# Patient Record
Sex: OTHER/UNKNOWN | URBAN - METROPOLITAN AREA
[De-identification: names, ages, dates, MRNs, and addresses within clinical notes are randomized per-mention and may not be internally consistent; named-entity substitution may affect disease eponyms.]

---

## 2024-08-05 ENCOUNTER — APPOINTMENT (OUTPATIENT)
Dept: URBAN - METROPOLITAN AREA CLINIC 298 | Age: 29
Setting detail: DERMATOLOGY
End: 2024-08-05

## 2024-08-05 DIAGNOSIS — L90.8 OTHER ATROPHIC DISORDERS OF SKIN: ICD-10-CM

## 2024-08-05 DIAGNOSIS — L98.8 OTHER SPECIFIED DISORDERS OF THE SKIN AND SUBCUTANEOUS TISSUE: ICD-10-CM

## 2024-08-05 PROCEDURE — OTHER DYSPORT: OTHER

## 2024-08-05 PROCEDURE — OTHER FILLERS: OTHER

## 2024-08-05 NOTE — PROCEDURE: DYSPORT
Anterior Platysmal Bands Units: 0
Show Additional Area 2: Yes
Additional Area 1 Location: nose
Show Right And Left Brow Units: No
Consent: Written consent obtained. Risks include but not limited to lid/brow ptosis, bruising, swelling, diplopia, temporary effect, incomplete chemical denervation.
Dilution (U/0.1 Cc): 10
Glabellar Complex Units: 50
Post-Care Instructions: Patient instructed to not lie down for 4 hours and limit physical activity for 24 hours.
Show Inventory Tab: Show
Additional Area 2 Location: chin
Detail Level: Detailed

## 2024-08-05 NOTE — PROCEDURE: FILLERS
Additional Area 1 Volume In Cc: 0
Include Cannula Information In Note?: No
Filler: Versa+
Detail Level: Detailed
Expiration Date (Month Year): 04/08/2025
Post-Care Instructions: After the procedure, patient instructed to apply ice to reduce swelling.
Additional Area 1 Location: chin
Consent: Written consent obtained. Risks include but not limited to bruising, beading, irregular texture, ulceration, infection, allergic reaction, scar formation, incomplete augmentation, temporary nature, and procedural pain.
Lot #: [id number]
Topical Anesthesia?: 15% lidocaine, 5% prilocaine, 0.25% phenenylephrine
Additional Anesthesia Volume In Cc: 6
Aspiration Statement: Aspiration was performed prior to injecting site with filler.
Expiration Date (Month Year): 09/18/2025
Anesthesia Volume In Cc: 0.5
Lot #: [id number]
Map Statment: See Attach Map for Complete Details
Anesthesia Type: 1% lidocaine with epinephrine
Vermilion Lips Filler Volume In Cc: 1
Additional Area 1 Location: above lip
Price (Use Numbers Only, No Special Characters Or $): 500.00